# Patient Record
Sex: MALE | Race: WHITE
[De-identification: names, ages, dates, MRNs, and addresses within clinical notes are randomized per-mention and may not be internally consistent; named-entity substitution may affect disease eponyms.]

---

## 2020-03-20 ENCOUNTER — HOSPITAL ENCOUNTER (OUTPATIENT)
Dept: HOSPITAL 53 - M SDC | Age: 80
Discharge: HOME | End: 2020-03-20
Attending: INTERNAL MEDICINE
Payer: MEDICARE

## 2020-03-20 VITALS — HEIGHT: 67 IN | BODY MASS INDEX: 29.66 KG/M2 | WEIGHT: 189 LBS

## 2020-03-20 VITALS — DIASTOLIC BLOOD PRESSURE: 81 MMHG | SYSTOLIC BLOOD PRESSURE: 163 MMHG

## 2020-03-20 DIAGNOSIS — I11.9: ICD-10-CM

## 2020-03-20 DIAGNOSIS — M10.9: ICD-10-CM

## 2020-03-20 DIAGNOSIS — R42: ICD-10-CM

## 2020-03-20 DIAGNOSIS — R55: Primary | ICD-10-CM

## 2020-03-20 DIAGNOSIS — M19.90: ICD-10-CM

## 2020-03-20 DIAGNOSIS — Z87.891: ICD-10-CM

## 2020-03-20 DIAGNOSIS — R94.31: ICD-10-CM

## 2020-03-20 DIAGNOSIS — N40.0: ICD-10-CM

## 2020-03-20 DIAGNOSIS — Z79.82: ICD-10-CM

## 2020-03-20 DIAGNOSIS — Z79.899: ICD-10-CM

## 2020-03-20 PROCEDURE — 33285 INSJ SUBQ CAR RHYTHM MNTR: CPT

## 2020-03-20 NOTE — RO
DATE OF PROCEDURE:  03/20/2020

 

PREOPERATIVE DIAGNOSIS:  Unexplained syncope.

 

POSTOPERATIVE DIAGNOSIS:  Unexplained syncope.

 

FINDINGS: Unexplained syncope.

 

OPERATIVE PROCEDURE:  Implantation of a [employer] implantable loop recorder.

 

SURGEON:  Amadeo Rodarte MD

 

ASSISTANT:  None.

 

ANESTHESIA:  Lidocaine 1% local/monitored anesthetic care.

 

SPECIMENS: None.

 

ESTIMATED BLOOD LOSS: Less than 1 mL

 

BLOOD PRODUCTS REPLACED: None.

 

DRAINS: None.

 

COMPLICATIONS: None.

 

DESCRIPTION OF OPERATION:   The patient was prepped and draped over the left

anterior chest. Lidocaine 1% was used for local anesthetic. Incision was made

with a #15 blade approximately 1 cm in length about 1 inch lateral to the left

parasternal border at approximately the left fourth interspace. The guide of the

insertion tool was then placed into the incision and advanced parallel to the

skin in the subcutaneous fat in a roughly 45 degrees left lateral-caudal

direction. The insertion tool was then rotated 180 degrees. The punch was then

placed into the insertion tool and was used to advance the loop recorder into the

subcutaneous fat. The punch was then removed and then the insertion tool was

removed leaving the loop recorder behind. The initial R wave amplitude measured

0.45 mV. Next a 4-0 Biosyn suture was placed subcuticular to temporarily

approximate the incision line with the ends of the suture protruding 1 cm beyond

the incision line on either side of its long access. The suture was used to keep

the skin well approximated during application of Dermabond. Next, three layers of

Dermabond was applied. The Biosyn suture was then pulled through the incision

line and removed in its entirety.

 

The implantable loop recorder implanted was a [employer] Reveal LINQ, model LNQ11

with serial # [id number].

## 2021-11-25 ENCOUNTER — HOSPITAL ENCOUNTER (OUTPATIENT)
Dept: HOSPITAL 53 - M ED | Age: 81
Setting detail: OBSERVATION
LOS: 1 days | Discharge: HOME | End: 2021-11-26
Attending: INTERNAL MEDICINE | Admitting: INTERNAL MEDICINE
Payer: MEDICARE

## 2021-11-25 VITALS — HEIGHT: 68 IN | WEIGHT: 170.35 LBS | BODY MASS INDEX: 25.82 KG/M2

## 2021-11-25 DIAGNOSIS — R55: Primary | ICD-10-CM

## 2021-11-25 DIAGNOSIS — N18.30: ICD-10-CM

## 2021-11-25 DIAGNOSIS — M10.9: ICD-10-CM

## 2021-11-25 DIAGNOSIS — I12.9: ICD-10-CM

## 2021-11-25 DIAGNOSIS — N40.0: ICD-10-CM

## 2021-11-25 DIAGNOSIS — N17.9: ICD-10-CM

## 2021-11-25 DIAGNOSIS — Z79.899: ICD-10-CM

## 2021-11-25 LAB
ALBUMIN SERPL BCG-MCNC: 3.8 GM/DL (ref 3.2–5.2)
ALT SERPL W P-5'-P-CCNC: 29 U/L (ref 12–78)
BASOPHILS # BLD AUTO: 0.1 10^3/UL (ref 0–0.2)
BASOPHILS NFR BLD AUTO: 0.7 % (ref 0–1)
BILIRUB CONJ SERPL-MCNC: 0.1 MG/DL (ref 0–0.2)
BILIRUB SERPL-MCNC: 0.3 MG/DL (ref 0.2–1)
BUN SERPL-MCNC: 25 MG/DL (ref 7–18)
CALCIUM SERPL-MCNC: 8.4 MG/DL (ref 8.8–10.2)
CHLORIDE SERPL-SCNC: 110 MEQ/L (ref 98–107)
CO2 SERPL-SCNC: 25 MEQ/L (ref 21–32)
CREAT SERPL-MCNC: 1.56 MG/DL (ref 0.7–1.3)
EOSINOPHIL # BLD AUTO: 0.2 10^3/UL (ref 0–0.5)
EOSINOPHIL NFR BLD AUTO: 2.1 % (ref 0–3)
ETHANOL SERPL-MCNC: 0.07 % (ref 0–0.01)
GFR SERPL CREATININE-BSD FRML MDRD: 45.5 ML/MIN/{1.73_M2} (ref 35–?)
GLUCOSE SERPL-MCNC: 109 MG/DL (ref 70–100)
HCT VFR BLD AUTO: 47.3 % (ref 42–52)
HGB BLD-MCNC: 14.9 G/DL (ref 13.5–17.5)
LYMPHOCYTES # BLD AUTO: 1.1 10^3/UL (ref 1.5–5)
LYMPHOCYTES NFR BLD AUTO: 14.6 % (ref 24–44)
MCH RBC QN AUTO: 29 PG (ref 27–33)
MCHC RBC AUTO-ENTMCNC: 31.5 G/DL (ref 32–36.5)
MCV RBC AUTO: 92.2 FL (ref 80–96)
MONOCYTES # BLD AUTO: 0.5 10^3/UL (ref 0–0.8)
MONOCYTES NFR BLD AUTO: 6.6 % (ref 2–8)
NEUTROPHILS # BLD AUTO: 5.7 10^3/UL (ref 1.5–8.5)
NEUTROPHILS NFR BLD AUTO: 75.5 % (ref 36–66)
OSMOLALITY SERPL: 312 MOSM/KG (ref 280–301)
PLATELET # BLD AUTO: 205 10^3/UL (ref 150–450)
POTASSIUM SERPL-SCNC: 3.9 MEQ/L (ref 3.5–5.1)
PROT SERPL-MCNC: 6.9 GM/DL (ref 6.4–8.2)
RBC # BLD AUTO: 5.13 10^6/UL (ref 4.3–6.1)
SODIUM SERPL-SCNC: 143 MEQ/L (ref 136–145)
TSH SERPL DL<=0.005 MIU/L-ACNC: 1.19 UIU/ML (ref 0.36–3.74)
WBC # BLD AUTO: 7.6 10^3/UL (ref 4–10)

## 2021-11-25 PROCEDURE — 85025 COMPLETE CBC W/AUTO DIFF WBC: CPT

## 2021-11-25 PROCEDURE — 87340 HEPATITIS B SURFACE AG IA: CPT

## 2021-11-25 PROCEDURE — 93041 RHYTHM ECG TRACING: CPT

## 2021-11-25 PROCEDURE — 82652 VIT D 1 25-DIHYDROXY: CPT

## 2021-11-25 PROCEDURE — 71045 X-RAY EXAM CHEST 1 VIEW: CPT

## 2021-11-25 PROCEDURE — 82306 VITAMIN D 25 HYDROXY: CPT

## 2021-11-25 PROCEDURE — 70450 CT HEAD/BRAIN W/O DYE: CPT

## 2021-11-25 PROCEDURE — 70551 MRI BRAIN STEM W/O DYE: CPT

## 2021-11-25 PROCEDURE — 84550 ASSAY OF BLOOD/URIC ACID: CPT

## 2021-11-25 PROCEDURE — 82077 ASSAY SPEC XCP UR&BREATH IA: CPT

## 2021-11-25 PROCEDURE — 84100 ASSAY OF PHOSPHORUS: CPT

## 2021-11-25 PROCEDURE — 83735 ASSAY OF MAGNESIUM: CPT

## 2021-11-25 PROCEDURE — 84443 ASSAY THYROID STIM HORMONE: CPT

## 2021-11-25 PROCEDURE — 84484 ASSAY OF TROPONIN QUANT: CPT

## 2021-11-25 PROCEDURE — 94760 N-INVAS EAR/PLS OXIMETRY 1: CPT

## 2021-11-25 PROCEDURE — 83930 ASSAY OF BLOOD OSMOLALITY: CPT

## 2021-11-25 PROCEDURE — 80048 BASIC METABOLIC PNL TOTAL CA: CPT

## 2021-11-25 PROCEDURE — 86705 HEP B CORE ANTIBODY IGM: CPT

## 2021-11-25 PROCEDURE — 99285 EMERGENCY DEPT VISIT HI MDM: CPT

## 2021-11-25 PROCEDURE — 81001 URINALYSIS AUTO W/SCOPE: CPT

## 2021-11-25 PROCEDURE — 80053 COMPREHEN METABOLIC PANEL: CPT

## 2021-11-25 PROCEDURE — 93005 ELECTROCARDIOGRAM TRACING: CPT

## 2021-11-25 PROCEDURE — 80047 BASIC METABLC PNL IONIZED CA: CPT

## 2021-11-25 PROCEDURE — 76775 US EXAM ABDO BACK WALL LIM: CPT

## 2021-11-25 PROCEDURE — 87798 DETECT AGENT NOS DNA AMP: CPT

## 2021-11-25 PROCEDURE — 80076 HEPATIC FUNCTION PANEL: CPT

## 2021-11-25 PROCEDURE — 36415 COLL VENOUS BLD VENIPUNCTURE: CPT

## 2021-11-25 PROCEDURE — 86803 HEPATITIS C AB TEST: CPT

## 2021-11-25 PROCEDURE — 86709 HEPATITIS A IGM ANTIBODY: CPT

## 2021-11-26 VITALS — DIASTOLIC BLOOD PRESSURE: 82 MMHG | SYSTOLIC BLOOD PRESSURE: 164 MMHG

## 2021-11-26 VITALS — DIASTOLIC BLOOD PRESSURE: 96 MMHG | SYSTOLIC BLOOD PRESSURE: 196 MMHG

## 2021-11-26 LAB
25(OH)D3 SERPL-MCNC: 38.6 NG/ML (ref 30–100)
ALBUMIN SERPL BCG-MCNC: 3.4 GM/DL (ref 3.2–5.2)
ALT SERPL W P-5'-P-CCNC: 26 U/L (ref 12–78)
BASOPHILS # BLD AUTO: 0.1 10^3/UL (ref 0–0.2)
BASOPHILS NFR BLD AUTO: 0.8 % (ref 0–1)
BILIRUB SERPL-MCNC: 0.5 MG/DL (ref 0.2–1)
BUN SERPL-MCNC: 24 MG/DL (ref 7–18)
CALCIUM SERPL-MCNC: 8.1 MG/DL (ref 8.8–10.2)
CHLORIDE SERPL-SCNC: 111 MEQ/L (ref 98–107)
CO2 SERPL-SCNC: 26 MEQ/L (ref 21–32)
CREAT SERPL-MCNC: 1.2 MG/DL (ref 0.7–1.3)
EOSINOPHIL # BLD AUTO: 0.2 10^3/UL (ref 0–0.5)
EOSINOPHIL NFR BLD AUTO: 3.2 % (ref 0–3)
GFR SERPL CREATININE-BSD FRML MDRD: > 60 ML/MIN/{1.73_M2} (ref 35–?)
GLUCOSE SERPL-MCNC: 114 MG/DL (ref 70–100)
HBV CORE IGM SER QL: NEGATIVE
HBV SURFACE AB SER-ACNC: NEGATIVE M[IU]/ML
HCT VFR BLD AUTO: 45 % (ref 42–52)
HCV AB SER QL: 0.1 INDEX (ref ?–0.8)
HGB BLD-MCNC: 14.2 G/DL (ref 13.5–17.5)
LYMPHOCYTES # BLD AUTO: 1.3 10^3/UL (ref 1.5–5)
LYMPHOCYTES NFR BLD AUTO: 17.8 % (ref 24–44)
MAGNESIUM SERPL-MCNC: 2.4 MG/DL (ref 1.8–2.4)
MCH RBC QN AUTO: 28.7 PG (ref 27–33)
MCHC RBC AUTO-ENTMCNC: 31.6 G/DL (ref 32–36.5)
MCV RBC AUTO: 90.9 FL (ref 80–96)
MONOCYTES # BLD AUTO: 0.7 10^3/UL (ref 0–0.8)
MONOCYTES NFR BLD AUTO: 10.4 % (ref 2–8)
NEUTROPHILS # BLD AUTO: 4.8 10^3/UL (ref 1.5–8.5)
NEUTROPHILS NFR BLD AUTO: 67.2 % (ref 36–66)
PHOSPHATE SERPL-MCNC: 3.5 MG/DL (ref 2.5–4.9)
PLATELET # BLD AUTO: 168 10^3/UL (ref 150–450)
POTASSIUM SERPL-SCNC: 4.2 MEQ/L (ref 3.5–5.1)
PROT SERPL-MCNC: 6.1 GM/DL (ref 6.4–8.2)
RBC # BLD AUTO: 4.95 10^6/UL (ref 4.3–6.1)
SODIUM SERPL-SCNC: 142 MEQ/L (ref 136–145)
URATE SERPL-MCNC: 4.4 MG/DL (ref 3.5–7.2)
WBC # BLD AUTO: 7.1 10^3/UL (ref 4–10)

## 2022-11-17 ENCOUNTER — HOSPITAL ENCOUNTER (OUTPATIENT)
Dept: HOSPITAL 53 - M RAD | Age: 82
End: 2022-11-17
Attending: INTERNAL MEDICINE
Payer: MEDICARE

## 2022-11-17 DIAGNOSIS — K86.9: Primary | ICD-10-CM

## 2022-11-17 PROCEDURE — 74183 MRI ABD W/O CNTR FLWD CNTR: CPT

## 2022-11-22 ENCOUNTER — HOSPITAL ENCOUNTER (OUTPATIENT)
Dept: HOSPITAL 53 - M SDC | Age: 82
Setting detail: OBSERVATION
LOS: 1 days | Discharge: HOME | End: 2022-11-23
Attending: INTERNAL MEDICINE | Admitting: STUDENT IN AN ORGANIZED HEALTH CARE EDUCATION/TRAINING PROGRAM
Payer: MEDICARE

## 2022-11-22 VITALS — SYSTOLIC BLOOD PRESSURE: 125 MMHG | DIASTOLIC BLOOD PRESSURE: 67 MMHG

## 2022-11-22 VITALS — SYSTOLIC BLOOD PRESSURE: 133 MMHG | DIASTOLIC BLOOD PRESSURE: 72 MMHG

## 2022-11-22 VITALS — SYSTOLIC BLOOD PRESSURE: 127 MMHG | DIASTOLIC BLOOD PRESSURE: 68 MMHG

## 2022-11-22 VITALS — DIASTOLIC BLOOD PRESSURE: 73 MMHG | SYSTOLIC BLOOD PRESSURE: 133 MMHG

## 2022-11-22 VITALS — WEIGHT: 146.39 LBS | HEIGHT: 67 IN | BODY MASS INDEX: 22.98 KG/M2

## 2022-11-22 VITALS — DIASTOLIC BLOOD PRESSURE: 72 MMHG | SYSTOLIC BLOOD PRESSURE: 131 MMHG

## 2022-11-22 VITALS — SYSTOLIC BLOOD PRESSURE: 134 MMHG | DIASTOLIC BLOOD PRESSURE: 68 MMHG

## 2022-11-22 DIAGNOSIS — K29.70: ICD-10-CM

## 2022-11-22 DIAGNOSIS — R16.1: ICD-10-CM

## 2022-11-22 DIAGNOSIS — Z80.0: ICD-10-CM

## 2022-11-22 DIAGNOSIS — I10: ICD-10-CM

## 2022-11-22 DIAGNOSIS — K83.1: Primary | ICD-10-CM

## 2022-11-22 DIAGNOSIS — Z87.891: ICD-10-CM

## 2022-11-22 DIAGNOSIS — R63.4: ICD-10-CM

## 2022-11-22 DIAGNOSIS — R26.81: ICD-10-CM

## 2022-11-22 DIAGNOSIS — Z79.899: ICD-10-CM

## 2022-11-22 DIAGNOSIS — C25.0: ICD-10-CM

## 2022-11-22 DIAGNOSIS — I95.1: ICD-10-CM

## 2022-11-22 DIAGNOSIS — K55.011: ICD-10-CM

## 2022-11-22 DIAGNOSIS — D50.9: ICD-10-CM

## 2022-11-22 DIAGNOSIS — M10.9: ICD-10-CM

## 2022-11-22 DIAGNOSIS — R74.01: ICD-10-CM

## 2022-11-22 DIAGNOSIS — N40.0: ICD-10-CM

## 2022-11-22 DIAGNOSIS — E55.9: ICD-10-CM

## 2022-11-22 DIAGNOSIS — E80.6: ICD-10-CM

## 2022-11-22 LAB
ALBUMIN SERPL BCG-MCNC: 2.4 G/DL (ref 3.2–5.2)
ALBUMIN SERPL BCG-MCNC: 2.5 G/DL (ref 3.2–5.2)
ALT SERPL W P-5'-P-CCNC: 259 U/L (ref 7–40)
ALT SERPL W P-5'-P-CCNC: 274 U/L (ref 7–40)
BILIRUB CONJ SERPL-MCNC: 14.4 MG/DL (ref ?–0.4)
BILIRUB SERPL-MCNC: 18.7 MG/DL (ref 0.3–1.2)
BILIRUB SERPL-MCNC: 20 MG/DL (ref 0.3–1.2)
BUN SERPL-MCNC: 21 MG/DL (ref 9–23)
CALCIUM SERPL-MCNC: 8.8 MG/DL (ref 8.3–10.6)
CHLORIDE SERPL-SCNC: 103 MMOL/L (ref 98–107)
CO2 SERPL-SCNC: 26 MMOL/L (ref 20–31)
CREAT SERPL-MCNC: 0.9 MG/DL (ref 0.7–1.3)
FERRITIN SERPL-MCNC: 1093.9 NG/ML (ref 10.5–307.3)
FOLATE SERPL-MCNC: 9.23 NG/ML (ref 5.4–?)
GFR SERPL CREATININE-BSD FRML MDRD: > 60 ML/MIN/{1.73_M2} (ref 35–?)
GLUCOSE SERPL-MCNC: 110 MG/DL (ref 74–106)
HBV CORE IGM SER QL: NEGATIVE
HBV SURFACE AB SER-ACNC: NEGATIVE M[IU]/ML
HCT VFR BLD AUTO: 35.9 % (ref 42–52)
HCV AB SER QL: 0.1 INDEX (ref ?–0.8)
HGB BLD-MCNC: 12.2 G/DL (ref 13.5–17.5)
INR PPP: 1.13
IRON SATN MFR SERPL: 16.7 % (ref 19.7–50)
IRON SERPL-MCNC: 36 UG/DL (ref 65–175)
MCH RBC QN AUTO: 27.6 PG (ref 27–33)
MCHC RBC AUTO-ENTMCNC: 34 G/DL (ref 32–36.5)
MCV RBC AUTO: 81.2 FL (ref 80–96)
PLATELET # BLD AUTO: 270 10^3/UL (ref 150–450)
POTASSIUM SERPL-SCNC: 3.9 MMOL/L (ref 3.5–5.1)
PROT SERPL-MCNC: 5.4 G/DL (ref 5.7–8.2)
PROT SERPL-MCNC: 5.8 G/DL (ref 5.7–8.2)
PROTHROMBIN TIME: 14.8 SECONDS (ref 12.5–14.5)
RBC # BLD AUTO: 4.42 10^6/UL (ref 4.3–6.1)
SODIUM SERPL-SCNC: 139 MMOL/L (ref 136–145)
TIBC SERPL-MCNC: 215 UG/DL (ref 250–425)
VIT B12 SERPL-MCNC: 660 PG/ML (ref 211–911)
WBC # BLD AUTO: 9.4 10^3/UL (ref 4–10)

## 2022-11-22 PROCEDURE — 82746 ASSAY OF FOLIC ACID SERUM: CPT

## 2022-11-22 PROCEDURE — 85610 PROTHROMBIN TIME: CPT

## 2022-11-22 PROCEDURE — 85027 COMPLETE CBC AUTOMATED: CPT

## 2022-11-22 PROCEDURE — 93975 VASCULAR STUDY: CPT

## 2022-11-22 PROCEDURE — 82728 ASSAY OF FERRITIN: CPT

## 2022-11-22 PROCEDURE — 36415 COLL VENOUS BLD VENIPUNCTURE: CPT

## 2022-11-22 PROCEDURE — 86709 HEPATITIS A IGM ANTIBODY: CPT

## 2022-11-22 PROCEDURE — 85025 COMPLETE CBC W/AUTO DIFF WBC: CPT

## 2022-11-22 PROCEDURE — 86705 HEP B CORE ANTIBODY IGM: CPT

## 2022-11-22 PROCEDURE — 86803 HEPATITIS C AB TEST: CPT

## 2022-11-22 PROCEDURE — 76700 US EXAM ABDOM COMPLETE: CPT

## 2022-11-22 PROCEDURE — 82270 OCCULT BLOOD FECES: CPT

## 2022-11-22 PROCEDURE — 87635 SARS-COV-2 COVID-19 AMP PRB: CPT

## 2022-11-22 PROCEDURE — 82607 VITAMIN B-12: CPT

## 2022-11-22 PROCEDURE — 97162 PT EVAL MOD COMPLEX 30 MIN: CPT

## 2022-11-22 PROCEDURE — 86901 BLOOD TYPING SEROLOGIC RH(D): CPT

## 2022-11-22 PROCEDURE — 80053 COMPREHEN METABOLIC PANEL: CPT

## 2022-11-22 PROCEDURE — 88104 CYTOPATH FL NONGYN SMEARS: CPT

## 2022-11-22 PROCEDURE — 83605 ASSAY OF LACTIC ACID: CPT

## 2022-11-22 PROCEDURE — 87340 HEPATITIS B SURFACE AG IA: CPT

## 2022-11-22 PROCEDURE — 80076 HEPATIC FUNCTION PANEL: CPT

## 2022-11-22 PROCEDURE — 86900 BLOOD TYPING SEROLOGIC ABO: CPT

## 2022-11-22 PROCEDURE — 83550 IRON BINDING TEST: CPT

## 2022-11-22 PROCEDURE — 74330 X-RAY BILE/PANC ENDOSCOPY: CPT

## 2022-11-22 PROCEDURE — 86850 RBC ANTIBODY SCREEN: CPT

## 2022-11-22 PROCEDURE — 43274 ERCP DUCT STENT PLACEMENT: CPT

## 2022-11-22 RX ADMIN — SUCRALFATE SCH GM: 1 SUSPENSION ORAL at 21:57

## 2022-11-23 VITALS — DIASTOLIC BLOOD PRESSURE: 69 MMHG | SYSTOLIC BLOOD PRESSURE: 109 MMHG

## 2022-11-23 VITALS — DIASTOLIC BLOOD PRESSURE: 68 MMHG | SYSTOLIC BLOOD PRESSURE: 121 MMHG

## 2022-11-23 VITALS — DIASTOLIC BLOOD PRESSURE: 55 MMHG | SYSTOLIC BLOOD PRESSURE: 88 MMHG

## 2022-11-23 VITALS — DIASTOLIC BLOOD PRESSURE: 78 MMHG | SYSTOLIC BLOOD PRESSURE: 138 MMHG

## 2022-11-23 VITALS — SYSTOLIC BLOOD PRESSURE: 132 MMHG | DIASTOLIC BLOOD PRESSURE: 73 MMHG

## 2022-11-23 LAB
ALBUMIN SERPL BCG-MCNC: 2.4 G/DL (ref 3.2–5.2)
ALT SERPL W P-5'-P-CCNC: 234 U/L (ref 7–40)
BASOPHILS # BLD AUTO: 0.1 10^3/UL (ref 0–0.2)
BASOPHILS NFR BLD AUTO: 0.5 % (ref 0–1)
BILIRUB SERPL-MCNC: 16.3 MG/DL (ref 0.3–1.2)
BUN SERPL-MCNC: 22 MG/DL (ref 9–23)
CALCIUM SERPL-MCNC: 8.7 MG/DL (ref 8.3–10.6)
CHLORIDE SERPL-SCNC: 101 MMOL/L (ref 98–107)
CO2 SERPL-SCNC: 24 MMOL/L (ref 20–31)
CREAT SERPL-MCNC: 0.88 MG/DL (ref 0.7–1.3)
EOSINOPHIL # BLD AUTO: 0.1 10^3/UL (ref 0–0.5)
EOSINOPHIL NFR BLD AUTO: 0.5 % (ref 0–3)
GFR SERPL CREATININE-BSD FRML MDRD: > 60 ML/MIN/{1.73_M2} (ref 35–?)
GLUCOSE SERPL-MCNC: 126 MG/DL (ref 74–106)
HCT VFR BLD AUTO: 33.6 % (ref 42–52)
HGB BLD-MCNC: 11.8 G/DL (ref 13.5–17.5)
LYMPHOCYTES # BLD AUTO: 0.6 10^3/UL (ref 1.5–5)
LYMPHOCYTES NFR BLD AUTO: 5.3 % (ref 24–44)
MCH RBC QN AUTO: 27.7 PG (ref 27–33)
MCHC RBC AUTO-ENTMCNC: 35.1 G/DL (ref 32–36.5)
MCV RBC AUTO: 78.9 FL (ref 80–96)
MONOCYTES # BLD AUTO: 0.9 10^3/UL (ref 0–0.8)
MONOCYTES NFR BLD AUTO: 8.1 % (ref 2–8)
NEUTROPHILS # BLD AUTO: 9.7 10^3/UL (ref 1.5–8.5)
NEUTROPHILS NFR BLD AUTO: 84.6 % (ref 36–66)
PLATELET # BLD AUTO: 298 10^3/UL (ref 150–450)
POTASSIUM SERPL-SCNC: 4 MMOL/L (ref 3.5–5.1)
PROT SERPL-MCNC: 5.4 G/DL (ref 5.7–8.2)
RBC # BLD AUTO: 4.26 10^6/UL (ref 4.3–6.1)
SODIUM SERPL-SCNC: 136 MMOL/L (ref 136–145)
WBC # BLD AUTO: 11.5 10^3/UL (ref 4–10)

## 2022-11-23 RX ADMIN — SUCRALFATE SCH GM: 1 SUSPENSION ORAL at 08:17

## 2022-11-28 ENCOUNTER — HOSPITAL ENCOUNTER (OUTPATIENT)
Dept: HOSPITAL 53 - M PLARAD | Age: 82
End: 2022-11-28
Attending: INTERNAL MEDICINE
Payer: MEDICARE

## 2022-11-28 DIAGNOSIS — R93.5: Primary | ICD-10-CM

## 2022-11-28 PROCEDURE — 78815 PET IMAGE W/CT SKULL-THIGH: CPT

## 2022-12-19 ENCOUNTER — HOSPITAL ENCOUNTER (OUTPATIENT)
Dept: HOSPITAL 53 - M LABSMTC | Age: 82
End: 2022-12-19
Payer: MEDICARE

## 2022-12-19 DIAGNOSIS — K86.89: ICD-10-CM

## 2022-12-19 DIAGNOSIS — Z01.812: Primary | ICD-10-CM

## 2022-12-19 DIAGNOSIS — Z20.822: ICD-10-CM

## 2023-01-02 ENCOUNTER — HOSPITAL ENCOUNTER (OUTPATIENT)
Dept: HOSPITAL 53 - M LABSMTC | Age: 83
End: 2023-01-02
Attending: ANESTHESIOLOGY
Payer: MEDICARE

## 2023-01-02 DIAGNOSIS — Z01.818: Primary | ICD-10-CM

## 2023-01-05 ENCOUNTER — HOSPITAL ENCOUNTER (OUTPATIENT)
Dept: HOSPITAL 53 - M IRPRO | Age: 83
End: 2023-01-05
Attending: INTERNAL MEDICINE
Payer: MEDICARE

## 2023-01-05 VITALS — SYSTOLIC BLOOD PRESSURE: 144 MMHG | DIASTOLIC BLOOD PRESSURE: 74 MMHG

## 2023-01-05 DIAGNOSIS — C25.9: Primary | ICD-10-CM

## 2023-01-05 PROCEDURE — 99153 MOD SED SAME PHYS/QHP EA: CPT

## 2023-01-05 PROCEDURE — 36561 INSERT TUNNELED CV CATH: CPT

## 2023-01-05 PROCEDURE — 99152 MOD SED SAME PHYS/QHP 5/>YRS: CPT

## 2023-01-18 ENCOUNTER — HOSPITAL ENCOUNTER (OUTPATIENT)
Dept: HOSPITAL 53 - M ONCR | Age: 83
End: 2023-01-18
Attending: RADIOLOGY
Payer: MEDICARE

## 2023-01-18 DIAGNOSIS — C78.7: ICD-10-CM

## 2023-01-18 DIAGNOSIS — Z87.891: ICD-10-CM

## 2023-01-18 DIAGNOSIS — Z80.0: ICD-10-CM

## 2023-01-18 DIAGNOSIS — I10: ICD-10-CM

## 2023-01-18 DIAGNOSIS — Z79.899: ICD-10-CM

## 2023-01-18 DIAGNOSIS — C25.0: Primary | ICD-10-CM

## 2023-01-24 ENCOUNTER — HOSPITAL ENCOUNTER (OUTPATIENT)
Dept: HOSPITAL 53 - M IRPOV | Age: 83
End: 2023-01-24
Attending: RADIOLOGY
Payer: MEDICARE

## 2023-01-24 VITALS — SYSTOLIC BLOOD PRESSURE: 156 MMHG | DIASTOLIC BLOOD PRESSURE: 80 MMHG

## 2023-01-24 VITALS — HEIGHT: 67 IN | BODY MASS INDEX: 25.95 KG/M2 | WEIGHT: 165.35 LBS

## 2023-01-24 DIAGNOSIS — Z45.2: Primary | ICD-10-CM

## 2023-01-25 ENCOUNTER — HOSPITAL ENCOUNTER (OUTPATIENT)
Dept: HOSPITAL 53 - M RAD | Age: 83
End: 2023-01-25
Attending: RADIOLOGY
Payer: MEDICARE

## 2023-01-25 DIAGNOSIS — C25.0: Primary | ICD-10-CM

## 2023-01-25 PROCEDURE — 74178 CT ABD&PLV WO CNTR FLWD CNTR: CPT

## 2023-02-21 ENCOUNTER — HOSPITAL ENCOUNTER (OUTPATIENT)
Dept: HOSPITAL 53 - M ONCR | Age: 83
LOS: 7 days | End: 2023-02-28
Attending: RADIOLOGY
Payer: MEDICARE

## 2023-02-21 DIAGNOSIS — C25.0: Primary | ICD-10-CM

## 2023-03-14 ENCOUNTER — HOSPITAL ENCOUNTER (OUTPATIENT)
Dept: HOSPITAL 53 - M RAD | Age: 83
End: 2023-03-14
Attending: NURSE PRACTITIONER
Payer: MEDICARE

## 2023-03-14 DIAGNOSIS — I82.412: ICD-10-CM

## 2023-03-14 DIAGNOSIS — C61: ICD-10-CM

## 2023-03-14 DIAGNOSIS — I82.432: Primary | ICD-10-CM

## 2023-03-14 DIAGNOSIS — R22.42: ICD-10-CM

## 2023-03-28 ENCOUNTER — HOSPITAL ENCOUNTER (OUTPATIENT)
Dept: HOSPITAL 53 - M RAD | Age: 83
End: 2023-03-28
Attending: NURSE PRACTITIONER
Payer: MEDICARE

## 2023-03-28 DIAGNOSIS — C25.9: Primary | ICD-10-CM

## 2023-04-05 ENCOUNTER — HOSPITAL ENCOUNTER (OUTPATIENT)
Dept: HOSPITAL 53 - M PAL | Age: 83
End: 2023-04-05
Attending: NURSE PRACTITIONER
Payer: MEDICARE

## 2023-04-05 VITALS — BODY MASS INDEX: 24.5 KG/M2 | WEIGHT: 156.09 LBS | HEIGHT: 67 IN

## 2023-04-05 VITALS — DIASTOLIC BLOOD PRESSURE: 64 MMHG | SYSTOLIC BLOOD PRESSURE: 88 MMHG

## 2023-04-05 DIAGNOSIS — Z86.718: ICD-10-CM

## 2023-04-05 DIAGNOSIS — R63.0: ICD-10-CM

## 2023-04-05 DIAGNOSIS — C25.9: Primary | ICD-10-CM

## 2023-04-05 DIAGNOSIS — Z79.899: ICD-10-CM

## 2023-04-05 DIAGNOSIS — Z92.3: ICD-10-CM

## 2023-04-05 DIAGNOSIS — R19.7: ICD-10-CM

## 2023-04-05 DIAGNOSIS — R11.0: ICD-10-CM

## 2023-04-05 DIAGNOSIS — R53.83: ICD-10-CM

## 2023-04-05 DIAGNOSIS — Z92.21: ICD-10-CM

## 2023-04-05 DIAGNOSIS — Z66: ICD-10-CM

## 2023-04-05 DIAGNOSIS — Z51.5: ICD-10-CM
